# Patient Record
Sex: MALE | Race: OTHER
[De-identification: names, ages, dates, MRNs, and addresses within clinical notes are randomized per-mention and may not be internally consistent; named-entity substitution may affect disease eponyms.]

---
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
## 2023-04-27 ENCOUNTER — HOSPITAL ENCOUNTER (INPATIENT)
Dept: HOSPITAL 15 - ER | Age: 18
LOS: 1 days | Discharge: HOME | DRG: 812 | End: 2023-04-28
Attending: INTERNAL MEDICINE | Admitting: NURSE PRACTITIONER
Payer: MEDICAID

## 2023-04-27 VITALS — WEIGHT: 170.2 LBS | BODY MASS INDEX: 25.21 KG/M2 | HEIGHT: 69 IN

## 2023-04-27 DIAGNOSIS — J69.0: ICD-10-CM
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
## 2023-04-27 DIAGNOSIS — Z20.822: ICD-10-CM

## 2023-04-27 DIAGNOSIS — J96.01: ICD-10-CM

## 2023-04-27 DIAGNOSIS — T40.2X1A: Primary | ICD-10-CM

## 2023-04-27 DIAGNOSIS — F17.210: ICD-10-CM

## 2023-04-27 LAB
ALBUMIN SERPL-MCNC: 3.8 G/DL (ref 3.4–5)
ALP SERPL-CCNC: 108 U/L (ref 45–117)
ALT SERPL-CCNC: 44 U/L (ref 16–61)
ANION GAP SERPL CALCULATED.3IONS-SCNC: 7 MMOL/L (ref 5–15)
APAP SERPL-MCNC: < 2 UG/ML (ref 10–30)
BILIRUB SERPL-MCNC: 0.3 MG/DL (ref 0.2–1)
BUN SERPL-MCNC: 11 MG/DL (ref 7–18)
BUN/CREAT SERPL: 8.6 (ref 10–20)
CALCIUM SERPL-MCNC: 8.4 MG/DL (ref 8.5–10.1)
CHLORIDE SERPL-SCNC: 110 MMOL/L (ref 98–107)
CO2 SERPL-SCNC: 21 MMOL/L (ref 21–32)
GLUCOSE SERPL-MCNC: 93 MG/DL (ref 74–106)
HCT VFR BLD AUTO: 42.9 % (ref 41–53)
HGB BLD-MCNC: 14.1 G/DL (ref 13.5–17.5)
MCH RBC QN AUTO: 29.7 PG (ref 28–32)
MCV RBC AUTO: 90.1 FL (ref 80–100)
NRBC BLD QL AUTO: 0.1 %
POTASSIUM SERPL-SCNC: 3.7 MMOL/L (ref 3.5–5.1)
PROT SERPL-MCNC: 7.3 G/DL (ref 6.4–8.2)
SALICYLATES SERPL-MCNC: < 1.7 MG/DL (ref 2.8–20)
SODIUM SERPL-SCNC: 138 MMOL/L (ref 136–145)

## 2023-04-27 PROCEDURE — 96365 THER/PROPH/DIAG IV INF INIT: CPT

## 2023-04-27 PROCEDURE — 96375 TX/PRO/DX INJ NEW DRUG ADDON: CPT

## 2023-04-27 PROCEDURE — 80053 COMPREHEN METABOLIC PANEL: CPT

## 2023-04-27 PROCEDURE — 85025 COMPLETE CBC W/AUTO DIFF WBC: CPT

## 2023-04-27 PROCEDURE — 99291 CRITICAL CARE FIRST HOUR: CPT

## 2023-04-27 PROCEDURE — 36415 COLL VENOUS BLD VENIPUNCTURE: CPT

## 2023-04-27 PROCEDURE — 93005 ELECTROCARDIOGRAM TRACING: CPT

## 2023-04-27 PROCEDURE — 80329 ANALGESICS NON-OPIOID 1 OR 2: CPT

## 2023-04-27 PROCEDURE — 71045 X-RAY EXAM CHEST 1 VIEW: CPT

## 2023-04-27 PROCEDURE — 87426 SARSCOV CORONAVIRUS AG IA: CPT

## 2023-04-28 VITALS — DIASTOLIC BLOOD PRESSURE: 57 MMHG | SYSTOLIC BLOOD PRESSURE: 99 MMHG

## 2023-04-28 LAB
ALBUMIN SERPL-MCNC: 3.9 G/DL (ref 3.4–5)
ALP SERPL-CCNC: 98 U/L (ref 45–117)
ALT SERPL-CCNC: 37 U/L (ref 16–61)
ANION GAP SERPL CALCULATED.3IONS-SCNC: 5 MMOL/L (ref 5–15)
BILIRUB SERPL-MCNC: 0.8 MG/DL (ref 0.2–1)
BUN SERPL-MCNC: 11 MG/DL (ref 7–18)
BUN/CREAT SERPL: 9.2 (ref 10–20)
CALCIUM SERPL-MCNC: 9.1 MG/DL (ref 8.5–10.1)
CHLORIDE SERPL-SCNC: 107 MMOL/L (ref 98–107)
CO2 SERPL-SCNC: 23 MMOL/L (ref 21–32)
GLUCOSE SERPL-MCNC: 90 MG/DL (ref 74–106)
HCT VFR BLD AUTO: 43.1 % (ref 41–53)
HGB BLD-MCNC: 14.4 G/DL (ref 13.5–17.5)
MCH RBC QN AUTO: 30 PG (ref 28–32)
MCV RBC AUTO: 90.2 FL (ref 80–100)
NRBC BLD QL AUTO: 0.4 %
POTASSIUM SERPL-SCNC: 4.2 MMOL/L (ref 3.5–5.1)
PROT SERPL-MCNC: 7.2 G/DL (ref 6.4–8.2)
SODIUM SERPL-SCNC: 135 MMOL/L (ref 136–145)

## 2023-04-28 RX ADMIN — SODIUM CHLORIDE SCH MLS/HR: 0.9 INJECTION, SOLUTION INTRAVENOUS at 18:38

## 2023-04-28 RX ADMIN — SODIUM CHLORIDE SCH MLS/HR: 0.9 INJECTION, SOLUTION INTRAVENOUS at 01:48

## 2023-07-09 ENCOUNTER — HOSPITAL ENCOUNTER (EMERGENCY)
Dept: HOSPITAL 15 - ER | Age: 18
Discharge: LEFT BEFORE DISCHARGE | End: 2023-07-09
Payer: COMMERCIAL

## 2023-07-09 VITALS — DIASTOLIC BLOOD PRESSURE: 66 MMHG | SYSTOLIC BLOOD PRESSURE: 149 MMHG

## 2023-07-09 VITALS — BODY MASS INDEX: 22.72 KG/M2 | HEIGHT: 68 IN | WEIGHT: 149.91 LBS

## 2023-07-09 DIAGNOSIS — T39.1X1A: Primary | ICD-10-CM
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
## 2023-07-09 DIAGNOSIS — Z53.21: ICD-10-CM

## 2025-02-17 ENCOUNTER — HOSPITAL ENCOUNTER (EMERGENCY)
Dept: HOSPITAL 15 - ER | Age: 20
LOS: 1 days | Discharge: LEFT BEFORE DISCHARGE | End: 2025-02-18
Payer: MEDICAID

## 2025-02-17 VITALS
RESPIRATION RATE: 14 BRPM | OXYGEN SATURATION: 95 % | SYSTOLIC BLOOD PRESSURE: 136 MMHG | DIASTOLIC BLOOD PRESSURE: 75 MMHG | HEART RATE: 78 BPM

## 2025-02-17 VITALS — TEMPERATURE: 98.8 F

## 2025-02-17 VITALS — HEIGHT: 65 IN | WEIGHT: 167.55 LBS | BODY MASS INDEX: 27.92 KG/M2

## 2025-02-17 DIAGNOSIS — Z79.899: ICD-10-CM

## 2025-02-17 DIAGNOSIS — R40.20: ICD-10-CM

## 2025-02-17 DIAGNOSIS — T40.411A: Primary | ICD-10-CM

## 2025-02-17 DIAGNOSIS — G93.41: ICD-10-CM
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
## 2025-02-17 PROCEDURE — 93005 ELECTROCARDIOGRAM TRACING: CPT

## 2025-02-17 NOTE — ECG
[facility]

                                       

Test Date:    2025               Test Time:    10:45:46

Pat Name:     YUNG STORY       Department:   ER

Patient ID:   [id number]           Room:          

Gender:       M                        Technician:   GORDON

:          2005               Requested By: CARLI TONG

Order Number: [id number]        Reading MD:    

                                 Measurements

Intervals                              Axis          

Rate:         109                      P:            36

AL:           130                      QRS:          63

QRSD:         93                       T:            15

QT:           310                                    

QTc:          418                                    

                           Interpretive Statements

Sinus tachycardia

ST elev, probable normal early repol pattern



Please click the below link to view image of tracing.
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
